# Patient Record
Sex: MALE | Race: OTHER | HISPANIC OR LATINO | Employment: UNEMPLOYED | URBAN - METROPOLITAN AREA
[De-identification: names, ages, dates, MRNs, and addresses within clinical notes are randomized per-mention and may not be internally consistent; named-entity substitution may affect disease eponyms.]

---

## 2022-04-05 LAB — EXTERNAL HIV SCREEN: NORMAL

## 2022-04-10 LAB
M TB IFN-G CD4+ BCKGRND COR BLD-ACNC: 0 IU/ML
M TB IFN-G CD4+ BCKGRND COR BLD-ACNC: 0 IU/ML
M TB IFN-G CD4+ T-CELLS BLD-ACNC: 0.02 IU/ML
M TB TUBERC IFN-G BLD QL: NEGATIVE
M TB TUBERC IGNF/MITOGEN IGNF CONTROL: 5.81 IU/ML

## 2022-04-18 ENCOUNTER — TELEPHONE (OUTPATIENT)
Dept: ADMINISTRATIVE | Facility: OTHER | Age: 32
End: 2022-04-18

## 2022-04-18 NOTE — TELEPHONE ENCOUNTER
----- Message from Nehemiah Mcconnell RN sent at 4/18/2022  7:49 AM EDT -----  Regarding: hiv  04/18/22 7:50 AM    Hello, our patient Amari Elliott has had HIV completed/performed  Please assist in updating the patient chart by pulling the Care Everywhere (CE) document  The date of service is 4/2022       Thank you,  Nehemiah Mcconnell RN   Medical [address]  [address]

## 2022-04-19 ENCOUNTER — OFFICE VISIT (OUTPATIENT)
Dept: FAMILY MEDICINE CLINIC | Facility: CLINIC | Age: 32
End: 2022-04-19

## 2022-04-19 VITALS
SYSTOLIC BLOOD PRESSURE: 120 MMHG | WEIGHT: 216 LBS | HEIGHT: 69 IN | RESPIRATION RATE: 20 BRPM | OXYGEN SATURATION: 98 % | DIASTOLIC BLOOD PRESSURE: 70 MMHG | TEMPERATURE: 97.9 F | HEART RATE: 88 BPM | BODY MASS INDEX: 31.99 KG/M2

## 2022-04-19 DIAGNOSIS — Z02.89 HISTORY AND PHYSICAL EXAMINATION, IMMIGRATION: Primary | ICD-10-CM

## 2022-04-19 PROCEDURE — 99499 UNLISTED E&M SERVICE: CPT | Performed by: FAMILY MEDICINE

## 2022-04-19 NOTE — PROGRESS NOTES
Assessment/Plan:  In the present exam for Deparment of [facility]; the person in part 1 of Form I-693 was identified by photo ID passport  The summary of Overall Findings is a No Class A or Class B Condition  Tuberculosis (TB) screening was performed finding negative Syphilis test was performed with non reactive report  GC urine DNA was non reactive   No Chancroid, Granuloma Inguinale, gonorrhea, Lymphogranuloma Venereum, Ku's Disease in any form, or history of treatment for it  I did not find any Mental Disorder with associated harmful behavior  Drug addiction from non-medical use with the listed in Schedule I, II, II, or V of section 202 of the Controlled Substances Act , includes any therapy given or rehablitation  The vaccine history was completed for each vaccine, all requirement were met as per CDC guidelines  No problem-specific Assessment & Plan notes found for this encounter  There are no diagnoses linked to this encounter  Subjective:      Patient ID: Amita Villegas is a 32 y o  male  Patient is here for INS exam  There is no history of Tuberculosis disease or exposure to TB in the past  There is not history of STD  Patient denies the use of Drugs or been treated for any drug related issues or dependency  There is not history of mental disease of mental treatment  Patient denies any distress at this time  Patient does not have  Relevant medical history  Urbano Shown was born in [facility]  he has as an [id number]  Current phone number is: [phone]  Current EMAIL ADDRESS IS [email or web address]        The following portions of the patient's history were reviewed and updated as appropriate: allergies, current medications, past family history, past medical history, past social history, past surgical history and problem list     Review of Systems   Constitutional: Negative for diaphoresis, fatigue, fever and unexpected weight change  Respiratory: Negative for apnea, cough, choking, chest tightness and shortness of breath  Cardiovascular: Negative for chest pain, palpitations and leg swelling  Gastrointestinal: Negative for abdominal distention, abdominal pain, anal bleeding, blood in stool and constipation  Musculoskeletal: Negative for arthralgias, back pain, gait problem and joint swelling  Neurological: Negative for dizziness, facial asymmetry, light-headedness and headaches  Psychiatric/Behavioral: Negative for behavioral problems, dysphoric mood and self-injury  The patient is not nervous/anxious  Objective:      /70 (BP Location: Left arm, Patient Position: Sitting, Cuff Size: Standard)   Pulse 88   Temp 97 9 °F (36 6 °C) (Temporal)   Resp 20   Ht 5' 9" (1 753 m)   Wt 98 kg (216 lb)   SpO2 98%   BMI 31 90 kg/m²          Physical Exam  Vitals and nursing note reviewed  Neck:      Thyroid: No thyroid mass or thyromegaly  Vascular: No carotid bruit or JVD  Trachea: No tracheal tenderness  Cardiovascular:      Rate and Rhythm: Normal rate and regular rhythm  No extrasystoles are present  Pulses: Normal pulses  Heart sounds: Normal heart sounds  Heart sounds not distant  No friction rub  Pulmonary:      Effort: Pulmonary effort is normal  No tachypnea or bradypnea  Breath sounds: Normal breath sounds  No stridor  Abdominal:      General: Bowel sounds are normal  There is no abdominal bruit  Palpations: Abdomen is soft  There is no hepatomegaly or splenomegaly  Hernia: No hernia is present  Musculoskeletal:         General: Normal range of motion  Cervical back: No edema or rigidity  Skin:     General: Skin is warm and dry  Neurological:      Mental Status: He is oriented to person, place, and time  Deep Tendon Reflexes: Reflexes are normal and symmetric  Psychiatric:         Behavior: Behavior normal          Thought Content:  Thought content normal          Judgment: Judgment normal

## 2022-04-27 PROBLEM — Z02.89 HISTORY AND PHYSICAL EXAMINATION, IMMIGRATION: Status: ACTIVE | Noted: 2022-04-27

## 2022-10-11 PROBLEM — Z02.89 HISTORY AND PHYSICAL EXAMINATION, IMMIGRATION: Status: RESOLVED | Noted: 2022-04-27 | Resolved: 2022-10-11

## 2023-01-01 ENCOUNTER — HOSPITAL ENCOUNTER (EMERGENCY)
Facility: HOSPITAL | Age: 33
Discharge: HOME | End: 2023-01-01
Attending: STUDENT IN AN ORGANIZED HEALTH CARE EDUCATION/TRAINING PROGRAM | Admitting: STUDENT IN AN ORGANIZED HEALTH CARE EDUCATION/TRAINING PROGRAM

## 2023-01-01 VITALS
WEIGHT: 235.7 LBS | DIASTOLIC BLOOD PRESSURE: 68 MMHG | BODY MASS INDEX: 34.81 KG/M2 | TEMPERATURE: 98.6 F | HEART RATE: 82 BPM | OXYGEN SATURATION: 100 % | SYSTOLIC BLOOD PRESSURE: 127 MMHG | RESPIRATION RATE: 20 BRPM

## 2023-01-01 DIAGNOSIS — S61.213A LACERATION OF LEFT MIDDLE FINGER WITHOUT FOREIGN BODY WITHOUT DAMAGE TO NAIL, INITIAL ENCOUNTER: Primary | ICD-10-CM

## 2023-01-01 NOTE — ED PROVIDER NOTES
History  Chief Complaint   Patient presents with   • Finger Laceration     Cut his left middle finger 30 min pta     Patient is a 58-year-old male coming in for evaluation of a laceration on his left middle finger  Approximate 3 months prior to arrival   Per chart review, patient's last Tdap was approximately in April 2022  Patient has normal sensation, normal range of motion      History provided by:  Patient   used: No    Finger Laceration  Location:  Finger  Finger laceration location:  L middle finger  Depth: Through underlying tissue  Time since incident:  30 minutes  Laceration mechanism:  Knife  Foreign body present:  No foreign bodies  Tetanus status:  Up to date (Apr22)  Associated symptoms: no fever, no numbness and no swelling        None       History reviewed  No pertinent past medical history  Past Surgical History:   Procedure Laterality Date   • APPENDECTOMY         Family History   Problem Relation Age of Onset   • Diabetes Mother      I have reviewed and agree with the history as documented  E-Cigarette/Vaping   • E-Cigarette Use Current Every Day User      E-Cigarette/Vaping Substances     Social History     Tobacco Use   • Smoking status: Never   • Smokeless tobacco: Never   Vaping Use   • Vaping Use: Every day   Substance Use Topics   • Alcohol use: Yes     Comment: occ   • Drug use: Not Currently       Review of Systems   Constitutional: Negative for fever  Musculoskeletal: Negative for arthralgias and joint swelling  Skin: Positive for wound  Physical Exam  Physical Exam  Vitals reviewed  Musculoskeletal:         General: Tenderness and signs of injury present  No swelling or deformity  Comments: Patient has laceration to the distal palmar surface of the left middle finger  Patient has normal sensation, as well as normal range of motion  Apley refill less than 2 seconds    Patient appears to have completely cut out the skin, there is nothing to suture at this time  Had patient wash hand in the sink for approximately 90 seconds, used wound spray, as well as surgical foam, and wrapped patient's finger  Did have a finger tourniquet on patient's finger for approximately 5 minutes to help with bleeding,   Skin:     Capillary Refill: Capillary refill takes less than 2 seconds  Vital Signs  ED Triage Vitals [01/01/23 1634]   Temperature Pulse Respirations Blood Pressure SpO2   98 6 °F (37 °C) 82 20 127/68 100 %      Temp Source Heart Rate Source Patient Position - Orthostatic VS BP Location FiO2 (%)   Oral Monitor Sitting Right arm --      Pain Score       --           Vitals:    01/01/23 1634   BP: 127/68   Pulse: 82   Patient Position - Orthostatic VS: Sitting         Visual Acuity      ED Medications  Medications - No data to display    Diagnostic Studies  Results Reviewed     None                 No orders to display              Procedures  Procedures         ED Course                               SBIRT 20yo+    Flowsheet Row Most Recent Value   SBIRT (25 yo +)    In order to provide better care to our patients, we are screening all of our patients for alcohol and drug use  Would it be okay to ask you these screening questions? Yes Filed at: 01/01/2023 1642   Initial Alcohol Screen: US AUDIT-C     1  How often do you have a drink containing alcohol? 0 Filed at: 01/01/2023 1642   2  How many drinks containing alcohol do you have on a typical day you are drinking? 0 Filed at: 01/01/2023 1642   3a  Male UNDER 65: How often do you have five or more drinks on one occasion? 0 Filed at: 01/01/2023 1642   3b  FEMALE Any Age, or MALE 65+: How often do you have 4 or more drinks on one occassion? 0 Filed at: 01/01/2023 1642   Audit-C Score 0 Filed at: 01/01/2023 1642   LINDA: How many times in the past year have you    Used an illegal drug or used a prescription medication for non-medical reasons?  Never Filed at: 01/01/2023 1642 Medical Decision Making  Patient comes in for laceration after he cut himself with a knife  Patient's tetanus is up-to-date  There is nothing to suture, so wound was cleaned, and patient was discharged home with supportive recommendations  Patient has no red flag symptoms, has normal sensation, and range of motion    Counseling: I had a detailed discussion with the patient and/or guardian regarding: the historical points, exam findings, and any diagnostic results supporting the discharge diagnosis, lab results, radiology results, discharge instructions reviewed with patient and/or family/caregiver and understanding was verbalized  Instructions given to return to the emergency department if symptoms worsen or persist, or if there are any questions or concerns that arise at home       Portions of the record may have been created with voice recognition software   Occasional wrong word or "sound a like" substitutions may have occurred due to the inherent limitations of voice recognition software   Read the chart carefully and recognize, using context, where substitutions have occurred  Laceration of left middle finger without foreign body without damage to nail, initial encounter: self-limited or minor problem      Disposition  Final diagnoses:   Laceration of left middle finger without foreign body without damage to nail, initial encounter     Time reflects when diagnosis was documented in both MDM as applicable and the Disposition within this note     Time User Action Codes Description Comment    1/1/2023  5:04 PM Yulia Preciado Add [[id number]] Laceration of left middle finger without foreign body without damage to nail, initial encounter       ED Disposition     ED Disposition   Discharge    Condition   Stable    Date/Time   Sun Jan 1, 2023  5:03 PM    [address] discharge to home/self care                 Follow-up Information     Follow up With Specialties Details Why Contact Info Additional Information    [facility] Emergency Department Emergency Medicine  As needed, If symptoms worsen [address] [id number] [address] [facility]          There are no discharge medications for this patient  No discharge procedures on file      PDMP Review     None          ED Provider  Electronically Signed by           Halley Naik PA-C  01/01/23 7342

## 2023-01-01 NOTE — Clinical Note
Ana Rosa Cutler was seen and treated in our emergency department on 1/1/2023  No restrictions            Diagnosis:     Shiela    He may return on this date: 01/02/2023         If you have any questions or concerns, please don't hesitate to call        Freyd Levy PA-C    ______________________________           _______________          _______________  Hospital Representative                              Date                                Time

## 2024-11-20 ENCOUNTER — OFFICE VISIT (OUTPATIENT)
Dept: FAMILY MEDICINE CLINIC | Facility: CLINIC | Age: 34
End: 2024-11-20
Payer: COMMERCIAL

## 2024-11-20 VITALS
WEIGHT: 268 LBS | HEIGHT: 71 IN | RESPIRATION RATE: 16 BRPM | OXYGEN SATURATION: 98 % | SYSTOLIC BLOOD PRESSURE: 124 MMHG | TEMPERATURE: 97.3 F | HEART RATE: 71 BPM | BODY MASS INDEX: 37.52 KG/M2 | DIASTOLIC BLOOD PRESSURE: 78 MMHG

## 2024-11-20 DIAGNOSIS — Z13.220 SCREENING FOR HYPERLIPIDEMIA: ICD-10-CM

## 2024-11-20 DIAGNOSIS — Z00.00 HEALTH CARE MAINTENANCE: Primary | ICD-10-CM

## 2024-11-20 DIAGNOSIS — E66.812 CLASS 2 OBESITY DUE TO EXCESS CALORIES WITH BODY MASS INDEX (BMI) OF 37.0 TO 37.9 IN ADULT, UNSPECIFIED WHETHER SERIOUS COMORBIDITY PRESENT: ICD-10-CM

## 2024-11-20 DIAGNOSIS — E66.09 CLASS 2 OBESITY DUE TO EXCESS CALORIES WITH BODY MASS INDEX (BMI) OF 37.0 TO 37.9 IN ADULT, UNSPECIFIED WHETHER SERIOUS COMORBIDITY PRESENT: ICD-10-CM

## 2024-11-20 PROCEDURE — 99385 PREV VISIT NEW AGE 18-39: CPT | Performed by: FAMILY MEDICINE

## 2024-11-20 NOTE — PROGRESS NOTES
Name: Shiela Gambino      : 1990      MRN: [id number]  Encounter Provider: Jaylyn Sands DO  Encounter Date: 2024   Encounter department: [facility] PRIMARY CARE  :  Chief Complaint   Patient presents with    Establish Care    Well Check     Patient Instructions   Here to establish care and needs updated labs and also rec losing weight to get BMI lower than 25 and rec also to see Bariatrics as per patient preference for weight loss counseling and need for GLP-1.     Assessment & Plan  Health care maintenance         Class 2 obesity due to excess calories with body mass index (BMI) of 37.0 to 37.9 in adult, unspecified whether serious comorbidity present           Screening for hyperlipidemia                History of Present Illness     Here for general PE and needs labs and is  and has 2 children. Patient is . Patient would like to see Bariatrics and weight loss counseling. Sleeps 8 - 10 hours sleep at times.       Review of Systems   Constitutional: Negative.    HENT: Negative.     Eyes: Negative.    Respiratory: Negative.     Cardiovascular: Negative.    Gastrointestinal: Negative.    Endocrine: Negative.    Genitourinary: Negative.    Musculoskeletal: Negative.    Skin: Negative.    Allergic/Immunologic: Negative.    Neurological: Negative.    Hematological: Negative.    Psychiatric/Behavioral: Negative.       Past Medical History   History reviewed. No pertinent past medical history.  Past Surgical History:   Procedure Laterality Date    APPENDECTOMY       Family History   Problem Relation Age of Onset    Diabetes Mother       reports that he has never smoked. He has never used smokeless tobacco. He reports current alcohol use. He reports that he does not currently use drugs.  No current outpatient medications on file prior to visit.     No current facility-administered medications on file prior to visit.   No Known Allergies   No current  "outpatient medications on file prior to visit.     No current facility-administered medications on file prior to visit.         Objective   /78   Pulse 71   Temp (!) 97.3 °F (36.3 °C) (Temporal)   Resp 16   Ht 5' 11\" (1.803 m)   Wt 122 kg (268 lb)   SpO2 98%   BMI 37.38 kg/m²      Physical Exam  Constitutional:       Appearance: He is well-developed. He is obese.   HENT:      Head: Normocephalic and atraumatic.      Right Ear: External ear normal.      Left Ear: External ear normal.      Nose: Nose normal.      Mouth/Throat:      Mouth: Mucous membranes are moist.   Eyes:      Conjunctiva/sclera: Conjunctivae normal.      Pupils: Pupils are equal, round, and reactive to light.   Cardiovascular:      Rate and Rhythm: Normal rate and regular rhythm.      Pulses: Normal pulses.      Heart sounds: Normal heart sounds.   Pulmonary:      Effort: Pulmonary effort is normal.      Breath sounds: Normal breath sounds.   Abdominal:      General: Abdomen is flat. Bowel sounds are normal.      Palpations: Abdomen is soft.   Genitourinary:     Penis: Normal.       Testes: Normal.   Musculoskeletal:         General: Normal range of motion.      Cervical back: Normal range of motion and neck supple.   Skin:     General: Skin is warm and dry.      Capillary Refill: Capillary refill takes less than 2 seconds.   Neurological:      General: No focal deficit present.      Mental Status: He is alert and oriented to person, place, and time. Mental status is at baseline.      Deep Tendon Reflexes: Reflexes are normal and symmetric.   Psychiatric:         Mood and Affect: Mood normal.         Behavior: Behavior normal.         Thought Content: Thought content normal.         Judgment: Judgment normal.       Administrative Statements   I have spent a total time of 30 minutes in caring for this patient on the day of the visit/encounter including Diagnostic results, Prognosis, Risks and benefits of tx options, Instructions for " management, Patient and family education, Importance of tx compliance, Risk factor reductions, Impressions, Counseling / Coordination of care, Documenting in the medical record, Reviewing / ordering tests, medicine, procedures  , and Obtaining or reviewing history  .

## 2024-11-20 NOTE — PATIENT INSTRUCTIONS
Here to establish care and needs updated labs and also rec losing weight to get BMI lower than 25 and rec also to see Bariatrics as per patient preference for weight loss counseling and need for GLP-1.